# Patient Record
Sex: MALE | Race: WHITE
[De-identification: names, ages, dates, MRNs, and addresses within clinical notes are randomized per-mention and may not be internally consistent; named-entity substitution may affect disease eponyms.]

---

## 2019-07-04 ENCOUNTER — HOSPITAL ENCOUNTER (EMERGENCY)
Dept: HOSPITAL 10 - E/R | Age: 34
LOS: 1 days | Discharge: HOME | End: 2019-07-05
Payer: COMMERCIAL

## 2019-07-04 ENCOUNTER — HOSPITAL ENCOUNTER (EMERGENCY)
Dept: HOSPITAL 91 - E/R | Age: 34
LOS: 1 days | Discharge: HOME | End: 2019-07-05
Payer: SELF-PAY

## 2019-07-04 VITALS — RESPIRATION RATE: 18 BRPM | DIASTOLIC BLOOD PRESSURE: 97 MMHG | SYSTOLIC BLOOD PRESSURE: 158 MMHG | HEART RATE: 66 BPM

## 2019-07-04 VITALS
WEIGHT: 198.42 LBS | WEIGHT: 198.42 LBS | HEIGHT: 71 IN | BODY MASS INDEX: 27.78 KG/M2 | HEIGHT: 71 IN | BODY MASS INDEX: 27.78 KG/M2

## 2019-07-04 DIAGNOSIS — R51: Primary | ICD-10-CM

## 2019-07-04 DIAGNOSIS — M54.2: ICD-10-CM

## 2019-07-04 DIAGNOSIS — M54.5: ICD-10-CM

## 2019-07-04 PROCEDURE — 72040 X-RAY EXAM NECK SPINE 2-3 VW: CPT

## 2019-07-04 PROCEDURE — 99283 EMERGENCY DEPT VISIT LOW MDM: CPT

## 2019-07-04 RX ADMIN — IBUPROFEN 1 MG: 800 TABLET, FILM COATED ORAL at 23:37
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
